# Patient Record
(demographics unavailable — no encounter records)

---

## 2025-07-01 NOTE — HISTORY OF PRESENT ILLNESS
[FreeTextEntry1] : 2025. CUCO COLINDRES 52-year-old female  LMP PM presents for an annual gyn exam.  Patient offers no complaints. No abnormal vaginal bleeding, pain, or vaginal discharge. No abdominal or pelvic pain.   She reports worsening stress and anxiety due to caring for father-in-law.   Pt considering removing saline breast implants . She has had them for 10 years.  Reviewed last pelvic sonogram from .   Reviewed patient's current medications. Denies changes in medical status, medications, serious illness, hospitalizations, and surgeries. Followed yearly by PCP for screening laboratories.  PSH b/l salpingectomy, laminectomy, breast implants () OBhx  Fhx father-prostate Denies breast, ovarian, uterine, or colon ca

## 2025-07-01 NOTE — SIGNATURES
[TextEntry] : This note was written by Lesli Cabrera 07/01/2025 on actively solely Dr. Darrell Malin M.D.  All medical record entries made by the scribe were at my, GILBERT Asif., direction and personally dictated by me on 07/01/2025. I have personally reviewed the chart and agree that the record reflects my personal performance of the history, physical exam, assessment, and plan.

## 2025-07-01 NOTE — PHYSICAL EXAM
[Chaperoned Physical Exam] : A chaperone was present in the examining room during all aspects of the physical examination. [FreeTextEntry2] : Lesli Cabrera [FreeTextEntry1] : medicla scribe [Appropriately responsive] : appropriately responsive [Alert] : alert [No Acute Distress] : no acute distress [No Lymphadenopathy] : no lymphadenopathy [Regular Rate Rhythm] : regular rate rhythm [No Murmurs] : no murmurs [Clear to Auscultation B/L] : clear to auscultation bilaterally [Soft] : soft [Non-tender] : non-tender [Non-distended] : non-distended [No HSM] : No HSM [No Lesions] : no lesions [No Mass] : no mass [Oriented x3] : oriented x3 [Examination Of The Breasts] : a normal appearance [No Masses] : no breast masses were palpable [Labia Majora] : normal [Labia Minora] : normal [Normal] : normal [Uterine Adnexae] : normal [FreeTextEntry9] :  Guaiac test negative, no masses noted

## 2025-07-01 NOTE — PLAN
[FreeTextEntry1] : Health Maintenance: Normal gyn examination. Pap smear conducted today. Rx given for mammogram and breast sonogram. Advised to schedule bone density. Advised pt to see PCP annually for screening laboratories. Nutrition and exercise discussed. RTO PRN or for annual gyn exam.